# Patient Record
Sex: FEMALE | Race: WHITE
[De-identification: names, ages, dates, MRNs, and addresses within clinical notes are randomized per-mention and may not be internally consistent; named-entity substitution may affect disease eponyms.]

---

## 2018-06-28 ENCOUNTER — HOSPITAL ENCOUNTER (EMERGENCY)
Dept: HOSPITAL 25 - ED | Age: 63
Discharge: HOME | End: 2018-06-28
Payer: COMMERCIAL

## 2018-06-28 VITALS — DIASTOLIC BLOOD PRESSURE: 74 MMHG | SYSTOLIC BLOOD PRESSURE: 156 MMHG

## 2018-06-28 DIAGNOSIS — E11.9: ICD-10-CM

## 2018-06-28 DIAGNOSIS — Z91.030: ICD-10-CM

## 2018-06-28 DIAGNOSIS — R51: ICD-10-CM

## 2018-06-28 DIAGNOSIS — I63.9: Primary | ICD-10-CM

## 2018-06-28 LAB
BASOPHILS # BLD AUTO: 0.1 10^3/UL (ref 0–0.2)
EOSINOPHIL # BLD AUTO: 0.2 10^3/UL (ref 0–0.6)
HCT VFR BLD AUTO: 42 % (ref 35–47)
HGB BLD-MCNC: 14.4 G/DL (ref 12–16)
LYMPHOCYTES # BLD AUTO: 2.5 10^3/UL (ref 1–4.8)
MCH RBC QN AUTO: 30 PG (ref 27–31)
MCHC RBC AUTO-ENTMCNC: 34 G/DL (ref 31–36)
MCV RBC AUTO: 87 FL (ref 80–97)
MONOCYTES # BLD AUTO: 0.7 10^3/UL (ref 0–0.8)
NEUTROPHILS # BLD AUTO: 6.8 10^3/UL (ref 1.5–7.7)
NRBC # BLD AUTO: 0 10^3/UL
NRBC BLD QL AUTO: 0
PLATELET # BLD AUTO: 251 10^3/UL (ref 150–450)
RBC # BLD AUTO: 4.86 10^6/UL (ref 4–5.4)
WBC # BLD AUTO: 10.3 10^3/UL (ref 3.5–10.8)

## 2018-06-28 PROCEDURE — 85025 COMPLETE CBC W/AUTO DIFF WBC: CPT

## 2018-06-28 PROCEDURE — 80061 LIPID PANEL: CPT

## 2018-06-28 PROCEDURE — 93306 TTE W/DOPPLER COMPLETE: CPT

## 2018-06-28 PROCEDURE — 93005 ELECTROCARDIOGRAM TRACING: CPT

## 2018-06-28 PROCEDURE — 99283 EMERGENCY DEPT VISIT LOW MDM: CPT

## 2018-06-28 PROCEDURE — 80053 COMPREHEN METABOLIC PANEL: CPT

## 2018-06-28 PROCEDURE — 70496 CT ANGIOGRAPHY HEAD: CPT

## 2018-06-28 PROCEDURE — 83605 ASSAY OF LACTIC ACID: CPT

## 2018-06-28 PROCEDURE — 70450 CT HEAD/BRAIN W/O DYE: CPT

## 2018-06-28 PROCEDURE — 84484 ASSAY OF TROPONIN QUANT: CPT

## 2018-06-28 PROCEDURE — 36415 COLL VENOUS BLD VENIPUNCTURE: CPT

## 2018-06-28 PROCEDURE — 70498 CT ANGIOGRAPHY NECK: CPT

## 2018-06-28 NOTE — ECHO
Patient:      GINNY ZUÑIGA  

[facility] Rec#:     [id number]            :          1955          

Date:         2018            Age:          62y                 

Account#:     [id number]          Height:       163 cm / 64.2 in

Accession#:   [id number]           Weight:       94 kg / 207.2 lbs

Sex:          F                     BSA:          1.99

Room#:        ED 4                  

Admit Date#:  2018          

Type:         Inpatient

 

Referring:    Robert Landeros MD

Reading:      Gadiel Riojas MD

Sonographer:  Elsa Hernandez RDCS,RDMS

CC:           Denae Balderas MD

______________________________________________________________________

 

Transthoracic Echocardiogram

 

Indication:

CVA

BP:           140/86

HR:           95

Rhythm:       NSR

 

Findings     

History:

DM 

 

Technical Comments:

The study quality is fair.  

 

Left Ventricle:

The left ventricular chamber size is normal. There is no left

ventricular hypertrophy.   There is normal left ventricular systolic

function. The estimated ejection fraction is 55-60%.  Abnormal left

ventricular diastolic function is observed. Abnormal left ventricular

diastolic filling is observed, consistent with impaired relaxation.  

 

Left Atrium:

The left atrium is slightly dilated.  

 

Right Ventricle:

The right ventricular chamber size and systolic function are within

normal limits. 

 

Right Atrium:

The right atrial cavity size is normal. The bubble study is negative. A

patent foramen ovale is not demonstrated with color Doppler and agitated

contrast.  

 

Aortic Valve:

The aortic valve is trileaflet. Systolic excursion of the aortic valve

is normal. There is no evidence of aortic regurgitation. There is no

evidence of aortic stenosis. 

 

Mitral Valve:

The mitral valve leaflets appear normal. There is a trace of mitral

regurgitation. There is no evidence of mitral stenosis. 

 

Tricuspid Valve:

The tricuspid valve leaflets are normal.  There is trace tricuspid

regurgitation.  Unable to estimate the right ventricular systolic

pressure.   

 

Pulmonic Valve:

The pulmonic valve structure is not well visualized. There is no

evidence of pulmonic valve thickening. There is a trace pulmonic

regurgitation.  

 

Pericardium:

There is no significant pericardial effusion. 

 

Aorta:

The aortic root appears normal. There is no dilatation of the aortic

arch. 

 

Pulmonary Artery:

The main pulmonary artery is not well visualized. 

 

Venous:

The inferior vena cava appears normal in size. There is a greater than

50% respiratory change in the inferior vena cava dimension. 

 

Contrast:

Intravenous agitated saline contrast was used to assess intracardiac

shunting.  

 

Summary:

There was not any prior study for comparison. 

 

Conclusions

The estimated ejection fraction is 55-60%. 

There is normal left ventricular systolic function.

The estimated ejection fraction is 55-60%. 

Abnormal left ventricular diastolic filling is observed, consistent with

impaired relaxation. 

The left atrium is slightly dilated. 

The bubble study is negative.

There is a trace of mitral regurgitation.

There is trace tricuspid regurgitation. 

 

Measurements     

Name                    Value         Normal Range            

RVIDd (AP) 2D           3.1 cm        (0.9 - 2.6)             

RVDdMajor (2D)          3.1 cm        (2.2 - 4.4)             

RAd ISD 4CH             4.5 cm        (3.4 - 4.9)             

RA (A4C)W               3.4 cm        (2.9 - 4.6)             

IVSd (2D)               1 cm          (0.6 - 1)               

LVPWd (2D)              1 cm          (0.6 - 1)               

LVIDd (2D)              4.5 cm        (3.6 - 5.4)             

LVIDs (2D)              3.2 cm        -                        

LV FS (2D)              27 %          (25 - 45)               

Aortic Annulus          2.2 cm        (1.4 - 2.6)             

Ao root diameter (2D)   3.4 cm        (2.1 - 3.5)             

Ascending Ao            3.1 cm        (2.1 - 3.4)             

Aortic arch             2.7 cm        (1.8 - 3.4)             

LA dimension (AP) 2D    3.5 cm        (2.3 - 3.8)             

LAd ISD 4CH             5.4 cm        (2.9 - 5.3)             

LA ISD 4CH W            3.5 cm        (2.5 - 4.5)             

 

Name                    Value         Normal Range            

MV E-wave Vmax          0.6 m/sec     -                        

MV deceleration time    145 msec      -                        

MV A-wave Vmax          0.9 m/sec     -                        

MV E:A ratio            0.7 ratio     -                        

LV septal e' Vmax       0.09 m/sec    -                        

LV lateral e' Vmax      0.08 m/sec    -                        

LV E:e' septal ratio    7 ratio       -                        

LV E:e' lateral ratio   7.5 ratio     -                        

 

Name                    Value         Normal Range            

AV Vmax                 1.1 m/sec     -                        

AV VTI                  20 cm         -                        

AV peak gradient        5 mmHg        -                        

AV mean gradient        3 mmHg        -                        

LVOT Vmax               0.9 m/sec     -                        

LVOT VTI                18 cm         -                        

LVOT peak gradient      3.2 mmHg      -                        

LVOT mean gradient      2 mmHg        -                        

 

Name                    Value         Normal Range            

RAP                     8 mmHg        -                        

IVC diameter            2 cm          -                        

 

Name                    Value         Normal Range            

PV Vmax                 0.7 m/sec     -                        

PV peak gradient        2 mmHg        -                        

 

Electronically signed by: Gadiel Riojas MD on 2018 13:46:41

## 2018-06-28 NOTE — RAD
INDICATION: Visual field defect left



COMPARISON: None



TECHNIQUE: Noncontrast axial source images were acquired from the skull base to the

vertex.



FINDINGS:



Ventricles/sulci: The ventricles and cisterns are normal in size and configuration for

age.



Brain parenchyma: There is decreased attenuation left parietal occipital region with minor

sulcal effacement consistent with a nonhemorrhagic infarct. There are no additional focal

probable findings Intracranial hemorrhage:None.



Extra-axial spaces: There are no abnormal extra axial fluid collections or evidence of

extra-axial mass.



Calvarium: There is no calvarial fracture or other calvarial abnormality.



Scalp: There is no evidence of scalp or extracalvarial soft tissue abnormality.



Paranasal sinuses/mastoid: The paranasal sinuses and mastoid air cells are clear.



Other: None.



IMPRESSION: NONHEMORRHAGIC AND SUBACUTE LEFT-SIDED PARIETO-OCCIPITAL INFARCT.



Findings called to ED.

## 2018-06-28 NOTE — ED
Aron BLISS Angela, scribed for Yosvany Daily MD on 06/28/18 at 1008 .





Neurological HPI





- HPI Summary


HPI Summary: 





This pt is a 61 y/o [place] [facility] [place]/o visual changes since 4 days 

ago. Pt reports that 4 days ago she had a headache described as sharp going 

through the middle of her forehead. Later that day she notes she had blurry 

vision. When she got up 3 days ago pt still had blurry vision and as the day 

went on her vision became worse. Pt called Dr. Oakley's office, 

ophthalmologist, yesterday and had tests done by Dr. Lundberg, including tests 

for glaucoma and diabetes related eye problems. Dr. Oakley told her 

everything was fine and was asked to return the next day. This morning pt saw 

Dr. Lundberg where she had a test done and was told to come to the ED for an MRI. 

Pt is not able to describe if visual changes are diplopia or blurry vision, she 

notes she can't focus and can't see through her peripheral vision.


Pt does wear glasses.





- History of Current Complaint


Chief Complaint: EDEyeProblem


Stated Complaint: TROUBLE WITH VISION


Time Seen by Provider: 06/28/18 09:45


Hx Obtained From: Patient


Onset/Duration: Started days ago, Still Present


Timing: Constant


Current Severity: Moderate


Pain Intensity: 0


Character: Visual Changes


Aggravating: Nothing


Alleviating: Nothing


Associated Signs and Symptoms: Positive: Visual Changes, Headache.  Negative: 

Fever





- Allergy/Home Medications


Allergies/Adverse Reactions: 


 Allergies











Allergy/AdvReac Type Severity Reaction Status Date / Time


 


bee venom protein (honey bee) Allergy  Anaphylatic Verified 06/28/18 10:12





   Shock  


 


NSAIDS (Non-Steroidal Allergy  GI Upset Verified 06/28/18 10:12





Anti-Inflamma     


 


Sulfa (Sulfonamide Allergy  Hives Verified 06/28/18 10:12





Antibiotics)     











Home Medications: 


 Home Medications





Acetaminophen TAB* [Tylenol TAB*] 325 mg PO Q4H PRN 06/28/18 [History Confirmed 

06/28/18]


Amitriptyline TAB* [Elavil TAB*] 10 mg PO BEDTIME PRN 06/28/18 [History 

Confirmed 06/28/18]


Dapagliflozin Propanediol [Farxiga] 10 mg PO DAILY 06/28/18 [History Confirmed 

06/28/18]


Dulaglutide (NF) [Trulicity (NF)] 1.5 mg SC WEEKLY 06/28/18 [History Confirmed 

06/28/18]


Multivitamins/Minerals TAB* [Theragran/minerals TAB*] 1 tab PO DAILY 06/28/18 [

History Confirmed 06/28/18]


metFORMIN* [Glucophage 1000 MG TAB *] 1,000 mg PO BID 06/28/18 [History 

Confirmed 06/28/18]











PMH/Surg Hx/FS Hx/Imm Hx


Endocrine/Hematology History: Reports: Hx Diabetes


Cardiovascular History: 


   Denies: Hx Hypertension


Respiratory History: Reports: Hx Asthma


Neurological History: Reports: Hx Peripheral Neuropathy





- Cancer History


Hx Chemotherapy: No


Hx Radiation Therapy: No


Infectious Disease History: No


Infectious Disease History: 


   Denies: Traveled Outside the [place] in Last 30 Days





- Family History


Known Family History: Positive: Cardiac Disease, Hypertension, Diabetes


Family History: CA





- Social History


Alcohol Use: None


Substance Use Type: Reports: None


Smoking Status (MU): Never Smoked Tobacco





Review of Systems


Negative: Fever, Chills


Eyes: Other - visual changes 


Negative: Blurred Vision


Gastrointestinal: Negative


Musculoskeletal: Negative


Positive: Headache


All Other Systems Reviewed And Are Negative: Yes





Physical Exam





- Summary


Physical Exam Summary: 





Appearance: The patient is well-nourished in no acute distress and in no acute 

pain.


Skin: The skin is warm and dry and skin color reflects adequate perfusion.


HEENT: The head is normocephalic and atraumatic. The pupils are equal and 

reactive. The conjunctivae are clear and without drainage. Nares are patent and 

without drainage. Mouth reveals moist mucous membranes and the throat is 

without erythema and exudate. The external ears are intact. The ear canals are 

patent and without drainage. The tympanic membranes are intact.


Neck: the neck is supple with full range of motion and non-tender. There are no 

carotid bruits. There is no neck vein distension.


Respiratory: Chest is non-tender. Lungs are clear to auscultation and breath 

sounds are symmetrical and equal.


Cardiovascular: Heart is regular rate and rhythm. There is no murmur or rub 

auscultated. There is no peripheral edema and pulses are symmetrical and equal.


Abdomen: The abdomen is soft and non-tender. There are normal bowel sounds 

heard in all four quadrants and there is no organomegaly palpated.


Musculoskeletal: There is no back tenderness noted. Extremities are non-tender 

with full range of motion. There is good capillary refill. There is no 

peripheral edema or calf tenderness elicited.


Neurological: Patient is alert and oriented to person, place and time. Pt has 

right hemianopia.


Psychiatric: The patient has an appropriate affect and does not exhibit any 

anxiety or depression.


Triage Information Reviewed: Yes


Vital Signs On Initial Exam: 


 Initial Vitals











Temp


 


 98.4 F 


 


 06/28/18 09:41











Vital Signs Reviewed: Yes





Diagnostics





- Vital Signs


 Vital Signs











  Temp Pulse Resp BP Pulse Ox


 


 06/28/18 09:53  98.7 F    


 


 06/28/18 09:47   93  22  152/85  99


 


 06/28/18 09:46   92  19   99


 


 06/28/18 09:41  98.4 F    














- Laboratory


Lab Results: 


 Lab Results











  06/28/18 06/28/18 06/28/18 Range/Units





  10:12 10:12 10:12 


 


WBC  10.3    (3.5-10.8)  10^3/ul


 


RBC  4.86    (4.00-5.40)  10^6/ul


 


Hgb  14.4    (12.0-16.0)  g/dl


 


Hct  42    (35-47)  %


 


MCV  87    (80-97)  fL


 


MCH  30    (27-31)  pg


 


MCHC  34    (31-36)  g/dl


 


RDW  14    (10.5-15)  %


 


Plt Count  251    (150-450)  10^3/ul


 


MPV  9.3    (7.4-10.4)  um3


 


Neut % (Auto)  65.9    (38-83)  %


 


Lymph % (Auto)  24.4 L    (25-47)  %


 


Mono % (Auto)  6.5    (0-7)  %


 


Eos % (Auto)  2.1    (0-6)  %


 


Baso % (Auto)  1.1    (0-2)  %


 


Absolute Neuts (auto)  6.8    (1.5-7.7)  10^3/ul


 


Absolute Lymphs (auto)  2.5    (1.0-4.8)  10^3/ul


 


Absolute Monos (auto)  0.7    (0-0.8)  10^3/ul


 


Absolute Eos (auto)  0.2    (0-0.6)  10^3/ul


 


Absolute Basos (auto)  0.1    (0-0.2)  10^3/ul


 


Absolute Nucleated RBC  0    10^3/ul


 


Nucleated RBC %  0    


 


Sodium   138   (135-145)  mmol/L


 


Potassium   3.3 L   (3.5-5.0)  mmol/L


 


Chloride   101   (101-111)  mmol/L


 


Carbon Dioxide   26   (22-32)  mmol/L


 


Anion Gap   11   (2-11)  mmol/L


 


BUN   11   (6-24)  mg/dL


 


Creatinine   0.74   (0.51-0.95)  mg/dL


 


Est GFR ( Amer)   96.9   (>60)  


 


Est GFR (Non-Af Amer)   80.1   (>60)  


 


BUN/Creatinine Ratio   14.9   (8-20)  


 


Glucose   150 H   ()  mg/dL


 


Lactic Acid    2.1 H*  (0.5-2.0)  mmol/L


 


Calcium   10.0   (8.6-10.3)  mg/dL


 


Total Bilirubin   0.70   (0.2-1.0)  mg/dL


 


AST   15   (13-39)  U/L


 


ALT   15   (7-52)  U/L


 


Alkaline Phosphatase   94   ()  U/L


 


Troponin I   0.01   (<0.04)  ng/mL


 


Total Protein   7.2   (6.4-8.9)  g/dL


 


Albumin   4.1   (3.2-5.2)  g/dL


 


Globulin   3.1   (2-4)  g/dL


 


Albumin/Globulin Ratio   1.3   (1-3)  


 


Triglycerides   168   mg/dL


 


Cholesterol   202   mg/dL


 


LDL Cholesterol   118   mg/dL


 


HDL Cholesterol   50.5   mg/dL











Result Diagrams: 


 06/28/18 10:12





 06/28/18 10:12


Lab Statement: Any lab studies that have been ordered have been reviewed, and 

results considered in the medical decision making process.





- CT


  ** Brain CT


CT Interpretation: Positive (See Comments) - IMPRESSION: Nonhemorrhagic and 

subacute left-sided parieto-occipital infarct. Dr. Daily has reviewed this 

radiology report.


CT Interpretation Completed By: Radiologist





- EKG


  ** 10:05


Cardiac Rate: NL - at 88 bpm


EKG Rhythm: Sinus Rhythm


EKG Interpretation: No STEMI. 





- Additional Comments


Diagnostic Additional Comments: 





CTA Head, as read by radiologist


IMPRESSION:


No CT evidence of aneurysm, significant stenosis, branch occlusion. Mild 

attenuation of the peripheral distribution of the left MCA supplying in the 

area of bland infarct in the left posterior parietal lobe. 





Transthoracic Echocardiogram, as read by Dr. Gadiel Riojas


CONCLUSIONS:


The estimated ejection fraction is 55-60%. 


There is normal left ventricular systolic function. 


The estimated ejection fracture is 55-60%. 


Abnormal left ventricular diastolic filling is observed, consistent with 

impaired relaxation. 


The left atrium is slightly dilated.


The bubble study is negative.


There is a trace of mitral regurgitation.


There is trace triscupid regurgitation.





Re-Evaluation





- Re-Evaluation


  ** First Eval


Re-Evaluation Time: 11:12


Comment: Dr. Scott, neurologist, in to see the pt.





Course/Dx





- Course


Course Of Treatment: Ms. Bangura presented to the emergency department with 

about 4 days of visual complaints.  It started with a sharp pain in her 

forehead going to the back of her head on Sunday night and subsequently on 

Monday morning her vision didn't seem right.  Her vision gradually worsened 

over the next several days and yesterday she went to the ophthalmologist.  He 

apparently did some testing and had her come back this morning for further 

testing.  After the testing this morning he recommended she come to the 

emergency department.  She presented with a right hemianopsia.  CT was positive 

for a left occipital lobe subacute infarct.  She was seen by Dr. Scott and Dr. Landeros.  She had a CTA and an echocardiogram and was discharged in stable 

condition for follow-up with her PCP.  She was started on aspirin, atorvastatin 

and Plavix.





- Diagnoses


Provider Diagnoses: 


 CVA (cerebral vascular accident)








- Physician Notifications


Discussed Care Of Patient With: Loren Scott


Time Discussed With Above Provider: 11:02


Instructed by Provider To: Other - I discussed pt care with Dr. Scott, 

neurologist, who will come see the pt in the ED.





- Critical Care Time


Critical Care Time: 30-74 min





Discharge





- Sign-Out/Discharge


Documenting (check all that apply): Discharge/Admit/Transfer - Discharge





- Discharge Plan


Condition: Stable


Disposition: HOME


Prescriptions: 


Atorvastatin* [Lipitor 80 MG*] 80 mg PO DAILY #30 tab


Clopidogrel TAB* [Plavix TAB*] 75 mg PO DAILY #30 tab


Patient Education Materials:  Stroke (DC)


Referrals: 


Denae Balderas MD [Primary Care Provider] - 


Additional Instructions: 


Take 81 mg of aspirin daily. 





Hold Metformin Friday, Saturday, and Sunday. Start Metformin again on Monday, 7/ 02/18. 





Please follow up with your primary care provider in 2-3 days.





RETURN TO THE ED FOR ANY WORSENING SYMPTOMS.





- Billing Disposition and Condition


Condition: STABLE


Disposition: Home





The documentation as recorded by the Aron siegel Angela accurately reflects 

the service I personally performed and the decisions made by me, Yosvany Daily MD.

## 2018-06-28 NOTE — CONS
NEUROLOGY CONSULTATION REPORT:

 

DATE OF CONSULT:  06/28/18 - EMERGENCY DEPT

 

REASON FOR CONSULT:  Neurology was consulted by Dr. Yosvany Daily to evaluate 
Jenny Bangura for stroke.

 

SOURCE OF INFORMATION:  The history was obtained by the patient and by the 
patient's sister, Yee Dawson, who was at bedside.

 

CHIEF COMPLAINT:  Visual problems.

 

HISTORY OF PRESENT ILLNESS:  Jenny Bangura is a 62-year-old very pleasant 
right- handed female with history of diabetes for 34 years, asthma, allergies, 
who [place] to [facility] [place]18 for a 4-day history of visual problems.  
The patient had a sharp pain on Sunday morning localized in the center of her 
forehead, 7/10 in severity, lasted less than 30 seconds, with no other symptoms 
or pain following the headache.  Later on that day, she felt a gradual onset of 
blurry vision.  She could not focus her eyes well.  She had no weakness or 
paresthesias. She had no swallowing dysfunction or slurred speech.  The 
symptoms progressed on Monday, Tuesday, and she was noticing significant 
problems writing on Wednesday that she called her ophthalmologist and scheduled 
an appointment for today.  She was evaluated by the ophthalmologist where a 
visual field testing revealed hemianopsia towards the right side.  The 
ophthalmologist recommended that she would go to the ER for further evaluation.
  The patient actually drove herself to the emergency room to be evaluated.  
NIH Stroke Scale today was 1 for partial homonymous hemianopsia on the right.  
A CT head showed a left high parietal ischemic infarction with mild cytotoxic 
edema.  A CTA head and neck was obtained and no large vessel occlusion was seen 
on imaging.  An echo was ordered and completed, but no results are currently 
available.  The patient had cholesterol level checked that showed an LDL of 118
, HDL of 50 and a total cholesterol of 202. The patient denied any focal 
weakness or paresthesias.  She denied any headaches or speech problems.  She 
denied any history of stroke or irregular rhythm.

 

PAST MEDICAL HISTORY:  Diabetes type 2 for 34 years, asthma, allergies, and 
cholecystectomy.

 

MEDICATIONS:

1.  Tylenol.

2.  Metformin.

3.  Farxiga.

4.  Trulicity.

5.  Multivitamin.

6.  Amitriptyline for sleep.

 

ALLERGIES:  To SULFA and NSAIDS where she has GI bleed when she takes ADVIL or 
ALEVE.  She has no problem with aspirin.  She is aspirin naive.

 

FAMILY HISTORY:  Father had a massive MI at age 45.  Mother had hypertension 
and recurrent strokes.  Both grandparents passed away due to massive strokes.

 

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  She denied 
any alcohol use.  She denied any tobacco use.  She is not .  She lives 
with her sister and brother.  The patient currently has 2 jobs.

 

REVIEW OF SYSTEMS:  A 14-point review of systems was obtained and otherwise 
negative except for what is mentioned in the HPI including no chest pain, 
shortness of breath, or palpitations.

 

PHYSICAL EXAM:  Vital Signs:  Temperature of 98.7, pulse rate of 102, 
respiratory rate of 19, oxygen saturation 97%, blood pressure of 158/82.  
General:  Well- nourished, well-developed, overweight female, in no acute 
distress.  She is alert, cooperative, and appears stated age.  Head is 
atraumatic, normocephalic without obvious abnormalities.  Eyes:  Conjunctivae/
corneas are clear.  Neck is supple and symmetrical with no carotid bruits.  
Lungs are clear to auscultation bilaterally, nonlabored breathing.  
Cardiovascular:  Regular rhythm with normal S1, S2.  Radial pulses are 
palpable.  Extremities:  Normal range of motion with no cyanosis.  Skin: No 
skin lesions or laceration.  Psych:  Affect is broad and normal mood.  Easy to 
establish rapport.  Neurological Examination:  Mental Status:  Awake and alert, 
oriented to person, place, time, and general circumstances.  Speech and 
language including expression, naming, repetition, and comprehension were 
assessed and found to be normal.  Cranial Nerves:  There is right 
quadrantanopia.  Pupils mid range and reactive to light.  Normal consensual 
response.  Extraocular muscles are otherwise intact.  No ptosis.  Sensation is 
intact on forehead, cheeks, and jaw region bilaterally.  No facial droop.  She 
is able to hear throughout the history process.  There is no palatal elevation.
  Tongue is symmetrical and midline with no atrophy or fasciculation.  Motor 
Examination Right/Left:  No abnormal movements. She has a mild pronator drift 
on the right, but otherwise muscle strength 5/5 in the upper and lower distal 
and proximal extremities.  Reflexes Right/Left: Brachioradialis 2/1, biceps 2/1
, triceps 2/1, patella 2/1, and ankles 0/0. Plantar, mute on the right and 
flexor on the left.  Sensation is intact to light touch and pinprick 
throughout.  Normal vibratory and proprioception at the great toes.  
Coordination:  Normal finger-to-nose and rapid alternating movements.  Gait: 
Narrow based, normal stance and gait.  No ataxia.

 

DIAGNOSTIC STUDIES/LAB DATA:  The patient had a WBC of 10.3, potassium of 3.3, 
glucose of 150, lactic acid of 2.1, LDL again of 118, HDL 50.

 

ASSESSMENT:

1.  Ms. Jenny Bangura is a pleasant 62-year-old female, who has sudden onset 
blurry vision.  On examination, the patient has a right inferior 
quadrantanopia. CT head showed a left distal inferior middle cerebral artery 
ischemic infarction. There is no evidence of vasogenic edema to suspect a mass 
lesion.  CTA of the head did not show any enhancing lesions around that area or 
large vessel occlusion.  NIH Stroke Scale currently is 1.  She would not be a 
candidate for intravenous tPA or mechanical thrombectomy given that she is 4 
days since the onset of her stroke symptoms.  The mechanism of the stroke could 
possibly be related to her small vessel disease, related to her risk factors or 
diabetes, overweight, dyslipidemia, and a strong genetic history of stroke.  We 
do need to rule out cardioembolic stroke.  She has no evidence of artery-to-
artery emboli since the CTA head and neck is unremarkable.

2.  Elevated blood pressure while she is in the emergency room - The patient 
had her blood pressure checked 2 weeks ago, and it was normal.  I recommend 
close monitoring of her blood pressure as an outpatient, specifically by her 
PCP.  If the blood pressure is still ranging in the 140-150, then I would 
recommend treating for newly-diagnosed hypertension.

3.  Diabetes mellitus type 2 - Recent A1c level was 6.9.  Continue close 
followup with a diabetician.

 

RECOMMENDATIONS:  The patient does not want to be admitted to the hospital and 
would prefer to go home to do any further workup.  I informed the patient that 
she still needs to get at least an echo, the 2D transthoracic echo before 
considering discharge.  She agreed and underwent the echo.  We will start by 
treating the patient with dual antiplatelet therapy, aspirin 81 mg and Plavix 
75 mg daily and she should stop the Plavix use after 30 days and continue with 
aspirin 81 mg daily. Also, please start her on high intensity of atorvastatin 
80 mg nightly.  She does not need PT/OT/SLP evaluation and treatment since the 
patient is completely asymptomatic from the physical aspect of the stroke and 
her deficits are purely inferior quadrantanopia.  Please perform a bedside 
swallow evaluation.  Secondary stroke prevention and stroke education was 
completed with the patient and I informed her, the importance of medication 
compliance, and adhering to a healthy lifestyle including the DASH diet and 
regular exercises.  We discussed the signs and symptoms of stroke that include, 
but are not limited to sudden-onset weakness, numbness, difficulty with language
/speech, facial droop, or visual changes, and difficulty with walking or 
balance.  If the patient has any symptoms, she was instructed to come to the ED 
immediately by contacting EMS.  She should not be driving until cleared by her 
ophthalmologist.  In addition, since the mechanism of the stroke is ill-defined 
and cryptogenic at this point, depending on the results of the transthoracic 
echo, she should follow up with a cardiologist as an outpatient for loop 
recorder implantation.  If the transthoracic echo is abnormal, then the patient 
would need admission for further evaluation.

 

I have discussed these recommendations with Dr. Yosvany Daily as well as Dr. Wolf Landeros, who agreed with the above plan.  I urged them to contact me 
for any questions or concerns.

 

 [id number]/CPS #: [id number]

ZOE

## 2018-06-28 NOTE — RAD
INDICATION: Nonhemorrhagic left hemispheric infarct



COMPARISON: Noncontrast CT brain same date

 

TECHNIQUE: Axial source images were acquired with coronal and sagittal reconstructions. CT

angiographic technique was utilized with injection of 80 mL Visipaque 320. 



FINDINGS: Aortic arch: There are no CT angiogram abnormalities of the arch or the great

vessels arising from the arch.



Right carotid: The common carotid artery, carotid bifurcation, extracranial portions of

the internal carotid artery, carotid artery at the skull base, carotid siphon, and carotid

termination appear widely patent. 



Left carotid:The common carotid artery, carotid bifurcation, extracranial portions of the

internal carotid artery, carotid artery at the skull base, carotid siphon, and carotid

termination appear widely patent. 



Right middle and anterior cerebral arteries: There are no CT angiographic abnormalities of

the middle or anterior cerebral arteries.



Left middle and anterior cerebral arteries: There are no CT angiographic abnormalities of

the anterior cerebral artery. There are no significant CT angiographic abnormalities of

the middle cerebral artery although there is mild attenuation of the of the small branches

of the MCA supplying the posterior left frontal lobe at the level of subacute infarct.



Right vertebral: The CT angiographic appearance of the vertebral artery is normal.



Left vertebral: The CT angiographic appearance of the vertebral artery is normal.



Basilar artery: The basilar artery and basilar tip appear normal.



Posterior cerebral arteries: The distal distribution of the right and left posterior

cerebral arteries is normal.



[place] of [place]: The CT angiographic appearance of the [place] is normal.



Source images show no evidence of mass or adenopathy within the neck. There is a

nonhemorrhagic left posterior parietal infarct as described on the CT of the brain.



IMPRESSION:  NO CT EVIDENCE OF ANEURYSM, SIGNIFICANT STENOSIS, BRANCH OCCLUSION. MILD

ATTENUATION OF THE PERIPHERAL DISTRIBUTION OF THE LEFT MCA SUPPLYING IN THE AREA OF BLAND

INFARCT IN THE LEFT POSTERIOR PARIETAL LOBE



CPT II Codes: 5280M4E RS